# Patient Record
Sex: MALE | Race: WHITE | Employment: OTHER | URBAN - METROPOLITAN AREA
[De-identification: names, ages, dates, MRNs, and addresses within clinical notes are randomized per-mention and may not be internally consistent; named-entity substitution may affect disease eponyms.]

---

## 2019-02-28 ENCOUNTER — HOSPITAL ENCOUNTER (OUTPATIENT)
Dept: CT IMAGING | Age: 54
Discharge: HOME | End: 2019-02-28
Payer: SELF-PAY

## 2019-02-28 DIAGNOSIS — Z29.9 PREVENTIVE MEASURE: ICD-10-CM

## 2019-02-28 PROCEDURE — 75571 CT HRT W/O DYE W/CA TEST: CPT

## 2019-03-04 NOTE — CARDIO/PULMONARY
Reached patient at his given mobile number and shared his coronary artery CT score of 417 with him. We discussed the meaning of this score and our recommendation that he follow up with a cardiologist within a week. Patient plans to contact a cardiologist he knows about scheduling an appointment and will get back with me regarding where to send the report. Patient has no further questions at this time.

## 2019-03-08 ENCOUNTER — TELEPHONE (OUTPATIENT)
Dept: CARDIAC REHAB | Age: 54
End: 2019-03-08

## 2019-03-08 NOTE — TELEPHONE ENCOUNTER
3/8/2019 [facility]: Mailed Mr.Jeffrey [facility] heart ct scan results to patient per Jeannie Briscoe.  Vivian Hightower

## 2019-03-20 NOTE — PROGRESS NOTES
CARDIOLOGY OFFICE NOTE                                        Kasi Delacruz MD, [address] [facility], [address], [place], [address] [facility]                                                       Phone [phone]; Fax [phone]                                                      Mobile [phone]   Voice Mail [phone]  No admission date for patient encounter. Olegario Bond MD    :  1965   MRN:  [id number]     LAST OFFICE VISIT : No Prior Visits    [place] is a 52 y.o. male who I am seeing for elevated calcium score           SUBJECTIVE    Mr. Tessie Harden reports intermittent episodes of racing heartbeats, which has been worsening over the past year. He also states his heart rate has been taking longer to slow down after exercise. He also reports some intermittent chest heaviness, which last occurred 3 days prior. He states it lasts 5-10 minutes, then resolved. He has associated dizziness and blurred vision. He states he had one episode this summer where pain radiated down his left arm. He has never had a stress test. He has never seen a cardiologist in the past.     He states he has been exercising less due to arthritis/pain in his back. He does try to stretch every day. He also reports some increased shortness of breath with exertion. He states he is able to walk his dog 1 mile without developing any chest discomfort. He states he feels as if his blood pressure has been elevated. He does not smoke. He states his father  suddenly at age 28 due to an aortic dissection. Patient denies any syncope, orthopnea, edema or paroxysmal nocturnal dyspnea. He has not woken up at night with racing heartbeats.      Allergies   Allergen Reactions    Prednisone Palpitations         Past Medical History: Diagnosis Date    Arthritis     Fetal spina bifida with myelomeningocele affecting obstetrical care         Past Surgical History:   Procedure Laterality Date    HX APPENDECTOMY      HX BACK SURGERY      HX KNEE ARTHROSCOPY Bilateral        Home Medications:  Current Outpatient Medications   Medication Sig    rosuvastatin (CRESTOR) 20 mg tablet Take 20 mg by mouth daily.  acetaminophen (TYLENOL PO) Take 600 mg by mouth daily.  naproxen sodium (ALEVE PO) Take 220 mg by mouth as needed. No current facility-administered medications for this visit. OBJECTIVE  ECG:(3/21/19) - Normal Sinus Rhythm    No results found for: INR, PTMR, PTP, PT1, PT2                CARDIOMETRICS      1. Calcium Score  2/28/19-FINDINGS:  The coronary calcium in each vessel is as follows:     Left main coronary artery: 19  Left anterior descending coronary artery: 274  Left circumflex coronary artery: 0  Right coronary artery: 0  Posterior descending coronary artery: 0  Ramus: 124     Total calcium score: 417                                                            Social History:  Social History     Tobacco Use    Smoking status: Never Smoker    Smokeless tobacco: Never Used   Substance Use Topics    Alcohol use: Yes     Comment: 4-5 drinks weekly       Family History:  Family History   Problem Relation Age of Onset    Heart Disease Father     Heart Disease Paternal Grandfather        Review of Symptoms:  A comprehensive review of systems ,particularly related to cardiovascular system, was negative except for that written in the HPI.     Physical Exam:    Visit Vitals  BP (!) 124/98 (BP 1 Location: Left arm, BP Patient Position: Sitting)   Pulse (!) 102   Resp 16   Ht 5' 10\" (1.778 m)   Wt 233 lb 3.2 oz (105.8 kg)   SpO2 94%   BMI 33.46 kg/m²       Physical Exam:  Gen: Well-developed, well-nourished, in no acute distress  alert and oriented x 3  HEENT:  Pink conjunctivae, Hearing grossly normal.No scleral icterus or conjunctival, moist mucous membranes  Neck: Supple,No JVD, No Carotid Bruit, Thyroid- non tender No cervical lymphadenopathy  Resp: No accessory muscle use, Clear breath sounds, No rales or rhonchi  Card: Regular Rate,Rythm,Normal S1, S2, No murmurs, rubs or gallop. No thrills. Abd:  Soft, non-tender, non-distended, normoactive bowel sounds are present,   MSK: No cyanosis or clubbing, good capillary refill  Skin: No rashes or ulcers, no bruising  Neuro:  Cranial nerves are grossly intact, moving all four extremities, no focal deficit, follows commands appropriately  Psych:  Good insight, oriented to person, place and time, alert, Nml Affect  LE: No edema  Vascular: Distal Pulses 2+ and symmetric        Medication change on this office visit: I have reviewed patients medication list and have made no changes today. ASSESSMENT/RECOMMENDATION:  1) Elevated coronary calcium score  - Score of 417 on 2/21/19  - I encouraged him to work on risk factor modification including exercising regularly and eating healthy and clean  - I will order an exercise stress test secondary also to chest discomfort and tachycardia     2) Tachycardia/Chest discomfort  - I will order a loop recorder for 4 weeks to look for arrhythmias  - I will also order an echo to assess LV dysfunction     3) Dyslipidemia  - Followed by Lidia Lynch MD  - He had not been taking Crestor 20 mg daily previously. His last cholesterol was off Crestor. He will have his cholesterol checked again in 3 months by Dr. Cesar Alfred. 4) Elevated Diastolic BP  - On re-check BP was 613/812  - Start Bystolic 2.5 mg daily  - He will return in 7-10 days for a blood pressure check     Follow up in 6 weeks or PRN         Patient Care Team:  Lidia Lynch MD as PCP - General (Internal Medicine)    I have extensively reviewed all testing with the patient.     I have discussed the diagnosis with the patient and the intended plan as seen in the above orders. Questions were answered concerning future plans. I have discussed medication side effects and warnings with the patient as well. Angie Rayo is in agreement to the plan listed above and wishes to proceed. he  was instructed not to smoke, eat heart healthy diet  and to exercise. Thank you for this consult.     Yandy Rivera    Written by Raji Colorado, as dictated by Dr. Sharyn Ortega.

## 2019-03-21 ENCOUNTER — CLINICAL SUPPORT (OUTPATIENT)
Dept: CARDIOLOGY CLINIC | Age: 54
End: 2019-03-21

## 2019-03-21 ENCOUNTER — OFFICE VISIT (OUTPATIENT)
Dept: CARDIOLOGY CLINIC | Age: 54
End: 2019-03-21

## 2019-03-21 ENCOUNTER — DOCUMENTATION ONLY (OUTPATIENT)
Dept: CARDIOLOGY CLINIC | Age: 54
End: 2019-03-21

## 2019-03-21 VITALS
SYSTOLIC BLOOD PRESSURE: 124 MMHG | HEART RATE: 102 BPM | DIASTOLIC BLOOD PRESSURE: 98 MMHG | RESPIRATION RATE: 16 BRPM | WEIGHT: 233.2 LBS | OXYGEN SATURATION: 94 % | HEIGHT: 70 IN | BODY MASS INDEX: 33.39 KG/M2

## 2019-03-21 DIAGNOSIS — R00.0 TACHYCARDIA: Primary | ICD-10-CM

## 2019-03-21 DIAGNOSIS — E78.5 DYSLIPIDEMIA: Primary | ICD-10-CM

## 2019-03-21 RX ORDER — ROSUVASTATIN CALCIUM 20 MG/1
20 TABLET, COATED ORAL DAILY
COMMUNITY

## 2019-03-21 RX ORDER — NEBIVOLOL 2.5 MG/1
2.5 TABLET ORAL DAILY
Qty: 30 TAB | Refills: 5 | Status: SHIPPED | OUTPATIENT
Start: 2019-03-21 | End: 2019-04-08 | Stop reason: ALTCHOICE

## 2019-03-21 NOTE — PROGRESS NOTES
Demi Garcia is a 52 y.o. male    Chief Complaint   Patient presents with    New Patient     elevated calcium score       1. Have you been to the ER, urgent care clinic since your last visit? Hospitalized since your last visit? NO    2. Have you seen or consulted any other health care providers outside of the [facility] since your last visit? Include any pap smears or colon screening. Dr Elba Runner PCP.     Visit Vitals  BP (!) 124/98 (BP 1 Location: Left arm, BP Patient Position: Sitting)   Pulse (!) 102   Resp 16   Ht 5' 10\" (1.778 m)   Wt 233 lb 3.2 oz (105.8 kg)   SpO2 94%   BMI 33.46 kg/m²

## 2019-03-21 NOTE — PATIENT INSTRUCTIONS
Self monitoring of your heart rate and rhythm is easy with ALIVECOR - visit  [email or web address]    Start Bystolic for your blood pressure

## 2019-03-25 ENCOUNTER — TELEPHONE (OUTPATIENT)
Dept: CARDIOLOGY CLINIC | Age: 54
End: 2019-03-25

## 2019-03-25 NOTE — TELEPHONE ENCOUNTER
Pt called stating the medication Bystolic 0.4VD will need a prior authorization through [facility] ([phone]).   Phone #[phone]  Thanks

## 2019-04-08 ENCOUNTER — TELEPHONE (OUTPATIENT)
Dept: CARDIOLOGY CLINIC | Age: 54
End: 2019-04-08

## 2019-04-08 RX ORDER — METOPROLOL SUCCINATE 25 MG/1
25 TABLET, EXTENDED RELEASE ORAL DAILY
Qty: 90 TAB | Refills: 1 | Status: SHIPPED | OUTPATIENT
Start: 2019-04-08 | End: 2019-05-02

## 2019-04-08 NOTE — TELEPHONE ENCOUNTER
Metoprolol XL 25mg daily RX placed per  note.  Sent to [address] [place] [address] per patient request

## 2019-04-08 NOTE — TELEPHONE ENCOUNTER
Patient called stating that he would like you to call his prescriptions to the [facility] in [place].  Thanks

## 2019-04-24 ENCOUNTER — DOCUMENTATION ONLY (OUTPATIENT)
Dept: CARDIOLOGY CLINIC | Age: 54
End: 2019-04-24

## 2019-05-01 NOTE — PROGRESS NOTES
LAST OFFICE VISIT : 3/21/2019        ICD-10-CM ICD-9-CM   1. Heart palpitations R00.2 785.1   2. Hypercholesterolemia E78.00 272.0   3. Chest pain, unspecified type R07.9 786.50   4. Tachycardia R00.0 785.0   5. Dyslipidemia E78.5 272.4            Yeimy Garland is a 52 y.o. male with dyslipidemia is here for f/u. Cardiac risk factors: dyslipidemia, male gender  I have personally obtained the history from the patient. HISTORY OF PRESENTING ILLNESS     Yeimy Garland reports he has some intermittent SOB and heart palpitations. He adds his sx sometimes occur after exertion but also sometimes occur after he wakes up. He notes applying pressure to his chest with ice helps relieve his sx. Pt states he was not pleased with his heart monitor since it would move around and he did not feel it was accurate. The patient denies chest pain/ shortness of breath, orthopnea, PND, LE edema, palpitations, syncope, presyncope or fatigue. ACTIVE PROBLEM LIST     There are no active problems to display for this patient.         PAST MEDICAL HISTORY     Past Medical History:   Diagnosis Date    Arthritis     Fetal spina bifida with myelomeningocele affecting obstetrical care            PAST SURGICAL HISTORY     Past Surgical History:   Procedure Laterality Date    HX APPENDECTOMY      HX BACK SURGERY      HX KNEE ARTHROSCOPY Bilateral           ALLERGIES     Allergies   Allergen Reactions    Prednisone Palpitations          FAMILY HISTORY     Family History   Problem Relation Age of Onset    Heart Disease Father     Heart Disease Paternal Grandfather     negative for cardiac disease       SOCIAL HISTORY     Social History     Socioeconomic History    Marital status:      Spouse name: Not on file    Number of children: Not on file    Years of education: Not on file    Highest education level: Not on file   Tobacco Use    Smoking status: Never Smoker    Smokeless tobacco: Never Used Substance and Sexual Activity    Alcohol use: Yes     Comment: 4-5 drinks weekly    Drug use: Never         MEDICATIONS     Current Outpatient Medications   Medication Sig    metoprolol succinate (TOPROL-XL) 25 mg XL tablet Take 0.5 Tabs by mouth daily.  rosuvastatin (CRESTOR) 20 mg tablet Take 20 mg by mouth daily.  acetaminophen (TYLENOL PO) Take 600 mg by mouth daily.  naproxen sodium (ALEVE PO) Take 220 mg by mouth as needed. No current facility-administered medications for this visit. I have reviewed the nurses notes, vitals, problem list, allergy list, medical history, family, social history and medications. REVIEW OF SYMPTOMS      General: Pt denies excessive weight gain or loss. Pt is able to conduct ADL's  HEENT: Denies blurred vision, headaches, hearing loss, epistaxis and difficulty swallowing. Respiratory: Denies cough, congestion, shortness of breath, CORADO, wheezing or stridor. Cardiovascular: Denies precordial pain, palpitations, edema or PND  Gastrointestinal: Denies poor appetite, indigestion, abdominal pain or blood in stool  Genitourinary: Denies hematuria, dysuria, increased urinary frequency  Musculoskeletal: Denies joint pain or swelling from muscles or joints  Neurologic: Denies tremor, paresthesias, headache, or sensory motor disturbance  Psychiatric: Denies confusion, insomnia, depression  Integumentray: Denies rash, itching or ulcers. Hematologic: Denies easy bruising, bleeding     PHYSICAL EXAMINATION      Vitals:    05/02/19 1111   BP: (!) 138/98   Pulse: 92   Resp: 18   Weight: 221 lb 12.8 oz (100.6 kg)   Height: 5' 10\" (1.778 m)     General: Well developed, in no acute distress. HEENT: No jaundice, oral mucosa moist, no oral ulcers  Neck: Supple, no stiffness, no lymphadenopathy, supple  Heart:  Normal S1/S2 negative S3 or S4.  Regular, no murmur, gallop or rub, no jugular venous distention  Respiratory: Clear bilaterally x 4, no wheezing or rales  Abdomen:   Soft, non-tender, bowel sounds are active. Extremities:  No edema, normal cap refill, no cyanosis. Musculoskeletal: No clubbing, no deformities  Neuro: A&Ox3, speech clear, gait stable, cooperative, no focal neurologic deficits  Skin: Skin color is normal. No rashes or lesions. Non diaphoretic, moist.  Vascular: 2+ pulses symmetric in all extremities     DIAGNOSTIC DATA     1. Calcium Score  2/28/19-FINDINGS:  The coronary calcium in each vessel is as follows:     Left main coronary artery: 19  Left anterior descending coronary artery: 274  Left circumflex coronary artery: 0  Right coronary artery: 0  Posterior descending coronary artery: 0  Ramus: 124     Total calcium score: 417    2. Echo  4/19/19- EF 51-55%    3. Stress Test  Lexiscan- 4/30/19- normal, EF 67%         LABORATORY DATA          No results found for: WBC, HGBPOC, HGB, HGBP, HCTPOC, HCT, PHCT, RBCH, PLT, MCV, HGBEXT, HCTEXT, PLTEXT, HGBEXT, HCTEXT, PLTEXT   No results found for: NA, K, CL, CO2, AGAP, GLU, BUN, CREA, BUCR, GFRAA, GFRNA, CA, TBIL, TBILI, GPT, SGOT, AP, TP, ALB, GLOB, AGRAT, ALT        ASSESSMENT/RECOMMENDATIONS:.      1) Elevated coronary calcium score  - Score of 417 on 2/21/19. All testing was negative. - I encouraged him to work on risk factor modification.    2) Tachycardia/Chest discomfort  - Wore loop recorder for very short period of time, did not stay on his chest.  - He inquired about other options. I suggested Jorge Reddy, will talk to Matt Barefoot to talk about options.    3) Dyslipidemia  - Followed by Jordan Juares MD     4) Elevated Diastolic BP  - Pt's BP is 138/98 in office today. - Start metoprolol XL 12. 5MG  - Pt was encouraged to decrease his sodium intake. - Recheck his BP in 6 weeks    Orders Placed This Encounter    TSH 3RD GENERATION    metoprolol succinate (TOPROL-XL) 25 mg XL tablet     Sig: Take 0.5 Tabs by mouth daily.      Dispense:  30 Tab     Refill:  5          Follow-up and Dispositions  ·   Return in about 6 months (around 11/2/2019). I have discussed the diagnosis with  Mary Carbajla and the intended plan as seen in the above orders. Questions were answered concerning future plans. I have discussed medication side effects and warnings with the patient as well. Thank you,  Jyothi Bunch MD for involving me in the care of  Mary Carbajal. Please do not hesitate to contact me for further questions/concerns. Written by Lupe Elizondo, as dictated by Maryam Arellano MD.     Jaime Rey MD, [facility]    Patient Care Team:  Jyothi Bunch MD as PCP - General (Internal Medicine)    [address], [address] [place] [address]      [phone] / [phone] Fax

## 2019-05-02 ENCOUNTER — OFFICE VISIT (OUTPATIENT)
Dept: CARDIOLOGY CLINIC | Age: 54
End: 2019-05-02

## 2019-05-02 VITALS
WEIGHT: 221.8 LBS | HEIGHT: 70 IN | SYSTOLIC BLOOD PRESSURE: 138 MMHG | BODY MASS INDEX: 31.75 KG/M2 | RESPIRATION RATE: 18 BRPM | DIASTOLIC BLOOD PRESSURE: 98 MMHG | HEART RATE: 92 BPM

## 2019-05-02 DIAGNOSIS — E78.00 HYPERCHOLESTEROLEMIA: ICD-10-CM

## 2019-05-02 DIAGNOSIS — R07.9 CHEST PAIN, UNSPECIFIED TYPE: ICD-10-CM

## 2019-05-02 DIAGNOSIS — E78.5 DYSLIPIDEMIA: ICD-10-CM

## 2019-05-02 DIAGNOSIS — R00.0 TACHYCARDIA: ICD-10-CM

## 2019-05-02 DIAGNOSIS — R00.2 HEART PALPITATIONS: Primary | ICD-10-CM

## 2019-05-02 RX ORDER — METOPROLOL SUCCINATE 25 MG/1
12.5 TABLET, EXTENDED RELEASE ORAL DAILY
Qty: 30 TAB | Refills: 5 | Status: SHIPPED | OUTPATIENT
Start: 2019-05-02 | End: 2019-11-13 | Stop reason: SDUPTHER

## 2019-05-02 NOTE — PROGRESS NOTES
Visit Vitals  BP (!) 138/98 (BP 1 Location: Left arm)   Pulse 92   Resp 18   Ht 5' 10\" (1.778 m)   Wt 221 lb 12.8 oz (100.6 kg)   BMI 31.82 kg/m²

## 2019-05-03 LAB — TSH SERPL DL<=0.005 MIU/L-ACNC: 2.02 UIU/ML (ref 0.45–4.5)

## 2019-11-13 RX ORDER — METOPROLOL SUCCINATE 25 MG/1
12.5 TABLET, EXTENDED RELEASE ORAL DAILY
Qty: 45 TAB | Refills: 0 | Status: SHIPPED | OUTPATIENT
Start: 2019-11-13 | End: 2020-01-27 | Stop reason: SDUPTHER

## 2020-01-27 RX ORDER — METOPROLOL SUCCINATE 25 MG/1
12.5 TABLET, EXTENDED RELEASE ORAL DAILY
Qty: 45 TAB | Refills: 0 | Status: SHIPPED | OUTPATIENT
Start: 2020-01-27